# Patient Record
Sex: FEMALE
[De-identification: names, ages, dates, MRNs, and addresses within clinical notes are randomized per-mention and may not be internally consistent; named-entity substitution may affect disease eponyms.]

---

## 2019-04-01 ENCOUNTER — HOSPITAL ENCOUNTER (OUTPATIENT)
Dept: HOSPITAL 5 - SPVIMAG | Age: 69
Discharge: HOME | End: 2019-04-01
Attending: INTERNAL MEDICINE
Payer: MEDICARE

## 2019-04-01 DIAGNOSIS — M50.323: Primary | ICD-10-CM

## 2019-04-01 DIAGNOSIS — M48.02: ICD-10-CM

## 2019-04-01 PROCEDURE — 72040 X-RAY EXAM NECK SPINE 2-3 VW: CPT

## 2019-04-01 NOTE — XRAY REPORT
CERVICAL SPINE SERIES THREE VIEWS: 04/01/19 12:13:00



CLINICAL: Neck pain and stiffness.



FINDINGS: Normal vertebral body height and alignment.  Mild disc space 

narrowing at C6-7.  The rest of the disc spaces are normal.  Small 

anterior osteophytes from C4-5 through C6-7 and most prominent at C6-7. 

 No fracture.  Multilevel facet joint sclerosis.  The odontoid and C1 

are normal.  Normal soft tissues and airway.



IMPRESSION: Moderate multilevel degenerative disc disease, worse at 

C6-7 and multilevel facet joint arthropathy.

## 2019-12-11 ENCOUNTER — HOSPITAL ENCOUNTER (OUTPATIENT)
Dept: HOSPITAL 5 - SPVWC | Age: 69
Discharge: HOME | End: 2019-12-11
Attending: INTERNAL MEDICINE
Payer: MEDICARE

## 2019-12-11 DIAGNOSIS — Z12.31: Primary | ICD-10-CM

## 2019-12-11 PROCEDURE — 77067 SCR MAMMO BI INCL CAD: CPT

## 2019-12-12 NOTE — MAMMOGRAPHY REPORT
DIGITAL SCREENING MAMMOGRAM WITH CAD, 12/11/2019



INDICATION: Routine screening mammography. 



TECHNIQUE:  Digital bilateral  2D mammography was obtained in the craniocaudal and mediolateral obliq
ue projections. This examination was interpreted with the benefit of Computer-Aided Detection analysi
s.



COMPARISON: 11/27/2018



FINDINGS: 



Breast Density: The breasts are heterogeneously dense, which may obscure small masses.



There is no evidence of dominant mass, suspicious calcifications or architectural distortion in eithe
r breast.



IMPRESSION: No mammographic evidence of malignancy.





Follow up recommendation: Routine yearly



BI-RADS Category 1:  Negative.



A "normal" or negative report should not discourage follow up or biopsy of a clinically significant f
inding.



A written summary of these findings will be mailed to the patient. The patient will be entered into a
 mammography reporting system which will generate a reminder letter for the patient's next appointmen
t at the appropriate interval.



The [employer] recommends yearly mammograms starting at age 40 and continuing as l
fuentes as a woman is in good health.  Breast MRI is recommended for women with an approximate 20-25% or 
greater lifetime risk of breast cancer, including women with a strong family history of breast or ova
lili cancer or who have been treated for Hodgkin's disease.



Signer Name: Colton Thompson MD 

Signed: 12/12/2019 10:59 AM

 Workstation Name: [id number]